# Patient Record
Sex: MALE | Race: OTHER
[De-identification: names, ages, dates, MRNs, and addresses within clinical notes are randomized per-mention and may not be internally consistent; named-entity substitution may affect disease eponyms.]

---

## 2022-06-29 ENCOUNTER — HOSPITAL ENCOUNTER (EMERGENCY)
Dept: HOSPITAL 94 - ER | Age: 57
LOS: 6 days | Discharge: HOME | End: 2022-07-05
Payer: MEDICAID

## 2022-06-29 VITALS — WEIGHT: 150.31 LBS | BODY MASS INDEX: 22.78 KG/M2 | HEIGHT: 68 IN

## 2022-06-29 DIAGNOSIS — F91.9: ICD-10-CM

## 2022-06-29 DIAGNOSIS — R41.82: ICD-10-CM

## 2022-06-29 DIAGNOSIS — U07.1: Primary | ICD-10-CM

## 2022-06-29 DIAGNOSIS — Z86.73: ICD-10-CM

## 2022-06-29 LAB
ALBUMIN SERPL BCP-MCNC: 3.4 G/DL (ref 3.4–5)
ALBUMIN/GLOB SERPL: 0.9 {RATIO} (ref 1.1–1.5)
ALP SERPL-CCNC: 72 IU/L (ref 46–116)
ALT SERPL W P-5'-P-CCNC: 21 U/L (ref 12–78)
AMPHETAMINES UR QL SCN: NEGATIVE
ANION GAP SERPL CALCULATED.3IONS-SCNC: 10 MMOL/L (ref 8–16)
AST SERPL W P-5'-P-CCNC: 19 U/L (ref 10–37)
BACTERIA URNS QL MICRO: (no result) /HPF
BARBITURATES UR QL SCN: NEGATIVE
BASOPHILS # BLD AUTO: 0 X10'3 (ref 0–0.2)
BASOPHILS NFR BLD AUTO: 0.4 % (ref 0–1)
BENZODIAZ UR QL SCN: NEGATIVE
BILIRUB SERPL-MCNC: 0.5 MG/DL (ref 0.1–1)
BUN SERPL-MCNC: 27 MG/DL (ref 7–18)
BUN/CREAT SERPL: 23.5 (ref 5.4–32)
BZE UR QL SCN: NEGATIVE
CALCIUM SERPL-MCNC: 8.5 MG/DL (ref 8.5–10.1)
CANNABINOIDS UR QL SCN: NEGATIVE
CHLORIDE SERPL-SCNC: 109 MMOL/L (ref 99–107)
CLARITY UR: CLEAR
CO2 SERPL-SCNC: 24.3 MMOL/L (ref 24–32)
COLOR UR: YELLOW
CREAT SERPL-MCNC: 1.15 MG/DL (ref 0.6–1.1)
DEPRECATED SQUAMOUS URNS QL MICRO: (no result) /LPF
EOSINOPHIL # BLD AUTO: 0.1 X10'3 (ref 0–0.9)
EOSINOPHIL NFR BLD AUTO: 1.2 % (ref 0–6)
ERYTHROCYTE [DISTWIDTH] IN BLOOD BY AUTOMATED COUNT: 14 % (ref 11.5–14.5)
ETHANOL SERPL-MCNC: < 0.01 GM/DL (ref 0–0.01)
GFR SERPL CREATININE-BSD FRML MDRD: 66 ML/MIN
GLUCOSE SERPL-MCNC: 103 MG/DL (ref 70–104)
GLUCOSE UR STRIP-MCNC: NEGATIVE MG/DL
HCT VFR BLD AUTO: 40.9 % (ref 42–52)
HGB BLD-MCNC: 14.1 G/DL (ref 14–17.9)
HGB UR QL STRIP: (no result)
HYALINE CASTS URNS QL MICRO: (no result) /LPF
KETONES UR STRIP-MCNC: NEGATIVE MG/DL
LEUKOCYTE ESTERASE UR QL STRIP: NEGATIVE
LYMPHOCYTES # BLD AUTO: 1.8 X10'3 (ref 1.1–4.8)
LYMPHOCYTES NFR BLD AUTO: 28 % (ref 21–51)
MCH RBC QN AUTO: 27.8 PG (ref 27–31)
MCHC RBC AUTO-ENTMCNC: 34.3 G/DL (ref 33–36.5)
MCV RBC AUTO: 80.8 FL (ref 78–98)
METHADONE UR QL SCN: NEGATIVE
MONOCYTES # BLD AUTO: 0.6 X10'3 (ref 0–0.9)
MONOCYTES NFR BLD AUTO: 10.1 % (ref 2–12)
MUCOUS THREADS URNS QL MICRO: (no result) /LPF
NEUTROPHILS # BLD AUTO: 3.8 X10'3 (ref 1.8–7.7)
NEUTROPHILS NFR BLD AUTO: 60.3 % (ref 42–75)
NITRITE UR QL STRIP: NEGATIVE
OPIATES UR QL SCN: NEGATIVE
PCP UR QL SCN: NEGATIVE
PH UR STRIP: 5.5 [PH] (ref 4.8–8)
PLATELET # BLD AUTO: 155 X10'3 (ref 140–440)
PMV BLD AUTO: 9.8 FL (ref 7.4–10.4)
POTASSIUM SERPL-SCNC: 3.6 MMOL/L (ref 3.5–5.1)
PROT SERPL-MCNC: 7.3 G/DL (ref 6.4–8.2)
PROT UR QL STRIP: (no result) MG/DL
RBC # BLD AUTO: 5.06 X10'6 (ref 4.7–6.1)
RBC #/AREA URNS HPF: (no result) /HPF (ref 0–2)
SODIUM SERPL-SCNC: 143 MMOL/L (ref 135–145)
SP GR UR STRIP: >=1.03 (ref 1–1.03)
URN COLLECT METHOD CLASS: (no result)
UROBILINOGEN UR STRIP-MCNC: 0.2 E.U/DL (ref 0.2–1)
WBC # BLD AUTO: 6.3 X10'3 (ref 4.5–11)
WBC #/AREA URNS HPF: (no result) /HPF (ref 0–4)

## 2022-06-29 PROCEDURE — 87811 SARS-COV-2 COVID19 W/OPTIC: CPT

## 2022-06-29 PROCEDURE — 80053 COMPREHEN METABOLIC PANEL: CPT

## 2022-06-29 PROCEDURE — 81001 URINALYSIS AUTO W/SCOPE: CPT

## 2022-06-29 PROCEDURE — 71045 X-RAY EXAM CHEST 1 VIEW: CPT

## 2022-06-29 PROCEDURE — 80320 DRUG SCREEN QUANTALCOHOLS: CPT

## 2022-06-29 PROCEDURE — 70450 CT HEAD/BRAIN W/O DYE: CPT

## 2022-06-29 PROCEDURE — 99285 EMERGENCY DEPT VISIT HI MDM: CPT

## 2022-06-29 PROCEDURE — 36415 COLL VENOUS BLD VENIPUNCTURE: CPT

## 2022-06-29 PROCEDURE — 84443 ASSAY THYROID STIM HORMONE: CPT

## 2022-06-29 PROCEDURE — 85025 COMPLETE CBC W/AUTO DIFF WBC: CPT

## 2022-06-29 PROCEDURE — 80305 DRUG TEST PRSMV DIR OPT OBS: CPT

## 2022-06-29 NOTE — NUR
Success



 

A new connect request was successfully created for:



 

JESSICA KINNEY 





: 1965





MRN: [id number]





ConnectID: [id number]









REASON:





Other Emergency







ACUITY:





Acuity Level 2



 



SUBMITTED:





2022 20:33 PDT

## 2022-06-29 NOTE — NUR
Consult company said they are waiting for a doctor to sign up for patient but 
that he is "in the queue"

## 2022-06-29 NOTE — NUR
.The patient to bed 25 via EMS after he was placed on a 5150 hold [facility]  for DTS and GD. The patient was seen by [facility] and placed on a 5150 hold. 
Covid test was positive. VICKY Landon and charge RN made aware.

## 2022-06-30 NOTE — NUR
5150 states the pt has 3 meds with him but the meds are with the pt belongs nor 
in the pharm. will attempt to call family to get med list since pt doesnt know 
what meds he takes

## 2022-07-01 NOTE — NUR
-------------------------------------------------------------------------------

           *** Note undone in ED - 07/01/22 at 0507 by [email or web address] ***            

-------------------------------------------------------------------------------

PT SLEEPING. EQUAL RISE AND FALL OF CHEST.

## 2022-07-01 NOTE — NUR
Patient found laying in bed sleeping. Patient awakes to ask for more blankets 
and to use urinal. Patient denies needing assistance and returns to sleep.

## 2022-07-01 NOTE — NUR
ASSUMED CARE OF PT. PT HAS NO COMPLAINTS AT THIS TIME AND IS ATTEMPTING TO 
SLEEP AGAIN AFTER HE WAS MOVED ROOMS.

## 2022-07-01 NOTE — NUR
Pt is resting in bed with eyes closed. No signs or symptoms of distress noted. 
Equal rise and fall of chest noted. Will continue to monitor.

## 2022-07-01 NOTE — NUR
Pt's sister Lois called and requested to speak with pt. Phone placed in 
pt's room and pt monitored from door until phone call finished. Pt is in bed 
resting. Pt states he has no needs at this time. No signs or symptoms of 
distress noted. Will continue to monitor.

## 2022-07-01 NOTE — NUR
Pt in bed resting with eyes open. Pt states he has no needs at this time. No 
signs or symptoms of distress noted. Will continue to monitor.

## 2022-07-01 NOTE — NUR
Pt resting in bed with eyes closed. No signs or symptoms of distress. Equal 
rise and fall of chest. Will continue to monitor.

## 2022-07-02 NOTE — NUR
ASSUMED CARE OF PT. PT LYING IN BED WATCHING STAFF WALKING THE HALLS. HE DENIES 
ANY NEEDS AT THIS TIME.

## 2022-07-02 NOTE — NUR
-------------------------------------------------------------------------------

           *** Note undone in EDM - 07/02/22 at 2029 by [email or web address] ***            

-------------------------------------------------------------------------------

PT PROVIDED URINE SAMPLE. COVID SWAB RESULTS ARE IN. REPEAT ELECTROLYTE LABS 
HAVE BEEN DRAWN.

## 2022-07-02 NOTE — NUR
PT USED BEDSIDE COMMODE. HE ALSO REQUESTED TO HAVE THE HEAD OF HIS BED LOWERED 
DOWN. HE ALREADY ATE DINNER.

## 2022-07-03 NOTE — NUR
[facility] tad office called and requested covid test to determine if patient has 
turned negative. np swab obtained and sent to lab.

## 2022-07-05 VITALS — DIASTOLIC BLOOD PRESSURE: 84 MMHG | SYSTOLIC BLOOD PRESSURE: 135 MMHG

## 2022-07-05 NOTE — NUR
PATIENT RE-EVALUATED PER [facility] AND PATIENT WILL BE RELEASED TO FAMILY THIS 
EVENING. RESTING IN BED. TOLERATED LUNCH WELL AND RETAINED.

## 2022-12-08 ENCOUNTER — HOSPITAL ENCOUNTER (OUTPATIENT)
Dept: HOSPITAL 94 - GI LAB | Age: 57
Discharge: HOME | End: 2022-12-08
Attending: INTERNAL MEDICINE
Payer: MEDICAID

## 2022-12-08 VITALS — SYSTOLIC BLOOD PRESSURE: 130 MMHG | DIASTOLIC BLOOD PRESSURE: 83 MMHG

## 2022-12-08 VITALS — DIASTOLIC BLOOD PRESSURE: 85 MMHG | SYSTOLIC BLOOD PRESSURE: 128 MMHG

## 2022-12-08 VITALS — DIASTOLIC BLOOD PRESSURE: 87 MMHG | SYSTOLIC BLOOD PRESSURE: 159 MMHG

## 2022-12-08 VITALS — DIASTOLIC BLOOD PRESSURE: 76 MMHG | SYSTOLIC BLOOD PRESSURE: 128 MMHG

## 2022-12-08 VITALS — SYSTOLIC BLOOD PRESSURE: 139 MMHG | DIASTOLIC BLOOD PRESSURE: 84 MMHG

## 2022-12-08 VITALS — HEIGHT: 69 IN | WEIGHT: 130.27 LBS | BODY MASS INDEX: 19.29 KG/M2

## 2022-12-08 DIAGNOSIS — D12.5: ICD-10-CM

## 2022-12-08 DIAGNOSIS — K62.5: Primary | ICD-10-CM

## 2022-12-08 DIAGNOSIS — D12.0: ICD-10-CM

## 2022-12-08 PROCEDURE — 99153 MOD SED SAME PHYS/QHP EA: CPT

## 2022-12-08 PROCEDURE — 45385 COLONOSCOPY W/LESION REMOVAL: CPT

## 2022-12-08 PROCEDURE — 99152 MOD SED SAME PHYS/QHP 5/>YRS: CPT

## 2023-07-16 ENCOUNTER — HOSPITAL ENCOUNTER (EMERGENCY)
Dept: HOSPITAL 94 - ER | Age: 58
Discharge: HOME | End: 2023-07-16
Payer: MEDICAID

## 2023-07-16 VITALS — SYSTOLIC BLOOD PRESSURE: 100 MMHG | DIASTOLIC BLOOD PRESSURE: 54 MMHG

## 2023-07-16 VITALS — BODY MASS INDEX: 19.27 KG/M2 | WEIGHT: 130.07 LBS | HEIGHT: 69 IN

## 2023-07-16 DIAGNOSIS — K59.00: Primary | ICD-10-CM

## 2023-07-16 DIAGNOSIS — Z79.899: ICD-10-CM

## 2023-07-16 DIAGNOSIS — E78.00: ICD-10-CM

## 2023-07-16 DIAGNOSIS — I10: ICD-10-CM

## 2023-07-16 DIAGNOSIS — F41.9: ICD-10-CM

## 2023-07-16 LAB
ALBUMIN SERPL BCP-MCNC: 3 G/DL (ref 3.4–5)
ALBUMIN/GLOB SERPL: 0.8 {RATIO} (ref 1.1–1.5)
ALP SERPL-CCNC: 69 IU/L (ref 46–116)
ALT SERPL W P-5'-P-CCNC: 22 U/L (ref 12–78)
ANION GAP SERPL CALCULATED.3IONS-SCNC: 9 MMOL/L (ref 8–16)
AST SERPL W P-5'-P-CCNC: 23 U/L (ref 10–37)
BACTERIA URNS QL MICRO: (no result) /HPF
BASOPHILS # BLD AUTO: 0 X10'3 (ref 0–0.2)
BASOPHILS NFR BLD AUTO: 0.4 % (ref 0–1)
BILIRUB SERPL-MCNC: 0.4 MG/DL (ref 0.1–1)
BUN SERPL-MCNC: 16 MG/DL (ref 7–18)
BUN/CREAT SERPL: 17.8 (ref 10–20)
CALCIUM SERPL-MCNC: 8.9 MG/DL (ref 8.5–10.1)
CAOX CRY URNS QL MICRO: (no result) /HPF
CHLORIDE SERPL-SCNC: 107 MMOL/L (ref 99–107)
CLARITY UR: (no result)
CO2 SERPL-SCNC: 26.6 MMOL/L (ref 24–32)
COLOR UR: YELLOW
CREAT SERPL-MCNC: 0.9 MG/DL (ref 0.6–1.1)
DEPRECATED SQUAMOUS URNS QL MICRO: (no result) /LPF
EOSINOPHIL # BLD AUTO: 0.1 X10'3 (ref 0–0.9)
EOSINOPHIL NFR BLD AUTO: 0.8 % (ref 0–6)
ERYTHROCYTE [DISTWIDTH] IN BLOOD BY AUTOMATED COUNT: 14 % (ref 11.5–14.5)
GFR SERPL CREATININE-BSD FRML MDRD: 87 ML/MIN
GLUCOSE SERPL-MCNC: 99 MG/DL (ref 70–104)
GLUCOSE UR STRIP-MCNC: NEGATIVE MG/DL
HCT VFR BLD AUTO: 37.1 % (ref 42–52)
HGB BLD-MCNC: 12.7 G/DL (ref 14–17.9)
HGB UR QL STRIP: NEGATIVE
KETONES UR STRIP-MCNC: 15 MG/DL
LEUKOCYTE ESTERASE UR QL STRIP: NEGATIVE
LYMPHOCYTES # BLD AUTO: 2.2 X10'3 (ref 1.1–4.8)
LYMPHOCYTES NFR BLD AUTO: 24.1 % (ref 21–51)
MCH RBC QN AUTO: 28.5 PG (ref 27–31)
MCHC RBC AUTO-ENTMCNC: 34.1 G/DL (ref 33–36.5)
MCV RBC AUTO: 83.4 FL (ref 78–98)
MONOCYTES # BLD AUTO: 0.6 X10'3 (ref 0–0.9)
MONOCYTES NFR BLD AUTO: 7 % (ref 2–12)
MUCOUS THREADS URNS QL MICRO: (no result) /LPF
NEUTROPHILS # BLD AUTO: 6.1 X10'3 (ref 1.8–7.7)
NEUTROPHILS NFR BLD AUTO: 67.7 % (ref 42–75)
NITRITE UR QL STRIP: NEGATIVE
PH UR STRIP: 6 [PH] (ref 4.8–8)
PLATELET # BLD AUTO: 202 X10'3 (ref 140–440)
PMV BLD AUTO: 8.7 FL (ref 7.4–10.4)
POTASSIUM SERPL-SCNC: 3.9 MMOL/L (ref 3.5–5.1)
PROT SERPL-MCNC: 6.8 G/DL (ref 6.4–8.2)
PROT UR QL STRIP: 30 MG/DL
RBC # BLD AUTO: 4.45 X10'6 (ref 4.7–6.1)
RBC #/AREA URNS HPF: (no result) /HPF (ref 0–2)
SODIUM SERPL-SCNC: 143 MMOL/L (ref 135–145)
SP GR UR STRIP: >=1.03 (ref 1–1.03)
URN COLLECT METHOD CLASS: (no result)
UROBILINOGEN UR STRIP-MCNC: 1 E.U/DL (ref 0.2–1)
WBC # BLD AUTO: 9 X10'3 (ref 4.5–11)
WBC #/AREA URNS HPF: (no result) /HPF (ref 0–4)

## 2023-07-16 PROCEDURE — 99284 EMERGENCY DEPT VISIT MOD MDM: CPT

## 2023-07-16 PROCEDURE — 80053 COMPREHEN METABOLIC PANEL: CPT

## 2023-07-16 PROCEDURE — 85025 COMPLETE CBC W/AUTO DIFF WBC: CPT

## 2023-07-16 PROCEDURE — 74018 RADEX ABDOMEN 1 VIEW: CPT

## 2023-07-16 PROCEDURE — 71045 X-RAY EXAM CHEST 1 VIEW: CPT

## 2023-07-16 PROCEDURE — 93005 ELECTROCARDIOGRAM TRACING: CPT

## 2023-07-16 PROCEDURE — 81001 URINALYSIS AUTO W/SCOPE: CPT

## 2023-07-16 PROCEDURE — 36415 COLL VENOUS BLD VENIPUNCTURE: CPT

## 2023-07-16 PROCEDURE — 84484 ASSAY OF TROPONIN QUANT: CPT

## 2023-07-16 PROCEDURE — 99285 EMERGENCY DEPT VISIT HI MDM: CPT

## 2025-06-20 ENCOUNTER — HOSPITAL ENCOUNTER (OUTPATIENT)
Dept: HOSPITAL 94 - RAD | Age: 60
Discharge: HOME | End: 2025-06-20
Attending: NEUROMUSCULOSKELETAL MEDICINE & OMM
Payer: MEDICARE

## 2025-06-20 DIAGNOSIS — R26.9: ICD-10-CM

## 2025-06-20 DIAGNOSIS — I63.81: Primary | ICD-10-CM

## 2025-06-20 DIAGNOSIS — J32.0: ICD-10-CM

## 2025-06-20 PROCEDURE — 70450 CT HEAD/BRAIN W/O DYE: CPT
